# Patient Record
Sex: FEMALE | Race: WHITE | NOT HISPANIC OR LATINO | URBAN - METROPOLITAN AREA
[De-identification: names, ages, dates, MRNs, and addresses within clinical notes are randomized per-mention and may not be internally consistent; named-entity substitution may affect disease eponyms.]

---

## 2021-03-26 DIAGNOSIS — Z23 ENCOUNTER FOR IMMUNIZATION: ICD-10-CM

## 2022-06-07 ENCOUNTER — OFFICE VISIT (OUTPATIENT)
Dept: BARIATRICS | Facility: CLINIC | Age: 52
End: 2022-06-07
Payer: COMMERCIAL

## 2022-06-07 VITALS
WEIGHT: 193 LBS | SYSTOLIC BLOOD PRESSURE: 134 MMHG | HEART RATE: 84 BPM | BODY MASS INDEX: 32.15 KG/M2 | HEIGHT: 65 IN | RESPIRATION RATE: 16 BRPM | DIASTOLIC BLOOD PRESSURE: 84 MMHG

## 2022-06-07 DIAGNOSIS — E66.9 OBESITY, CLASS I, BMI 30-34.9: Primary | ICD-10-CM

## 2022-06-07 DIAGNOSIS — F41.9 ANXIETY: ICD-10-CM

## 2022-06-07 PROBLEM — F32.A DEPRESSION: Status: ACTIVE | Noted: 2022-06-07

## 2022-06-07 PROCEDURE — 99203 OFFICE O/P NEW LOW 30 MIN: CPT | Performed by: NURSE PRACTITIONER

## 2022-06-07 RX ORDER — DIPHENOXYLATE HYDROCHLORIDE AND ATROPINE SULFATE 2.5; .025 MG/1; MG/1
1 TABLET ORAL DAILY
COMMUNITY

## 2022-06-07 RX ORDER — CETIRIZINE HYDROCHLORIDE 10 MG/1
10 TABLET ORAL DAILY
COMMUNITY

## 2022-06-07 RX ORDER — CITALOPRAM 20 MG/1
20 TABLET ORAL DAILY
COMMUNITY
Start: 2021-10-07 | End: 2022-10-07

## 2022-06-07 NOTE — ASSESSMENT & PLAN NOTE
- Discussed options of [facility]-Intensive Lifestyle Intervention Program, Very Low Calorie Diet-VLCD and Conservative Program   - Patient is interested in pursuing [facility]-Intensive Lifestyle Intervention Program  - Initial weight loss goal of 5-10% weight loss for improved health  - Weight loss can improve patient's co-morbid conditions and/or prevent weight-related complications  - Stop Bang 1/8  - Labs reviewed: TSH, lipid panel and CMP 12/27/2021 and all within acceptable range  - Check A1C and fasting insulin   - Not a GLP-1 candidate due to family history of thyroid cancer (father had history of unknown type of thyroid cancer)  Goals:  Do not skip meals  Food log (ie ) www myfitnesspal com,sparkpeople  com,loseit com,calorieking  com,etc  baritastic (use skinnytaste  com, dietdoctor  com or smartphone [place] Med[employer] for recipes)  No sugary beverages  At least 64oz of water daily  Increase physical activity by 10 minutes daily  Gradually increase physical activity to a goal of 5 days per week for 30 minutes of MODERATE intensity PLUS 2 days per week of FULL BODY resistance training (use smartphone apps [employer], Home Workout, etc ) Planning on pool exercises due to knee pain

## 2022-06-07 NOTE — PROGRESS NOTES
Assessment/Plan:    Obesity, Class I, BMI 30-34 9  - Discussed options of [facility]-Intensive Lifestyle Intervention Program, Very Low Calorie Diet-VLCD and Conservative Program   - Patient is interested in pursuing [facility]-Intensive Lifestyle Intervention Program  - Initial weight loss goal of 5-10% weight loss for improved health  - Weight loss can improve patient's co-morbid conditions and/or prevent weight-related complications  - Stop Bang 1/8  - Labs reviewed: TSH, lipid panel and CMP 12/27/2021 and all within acceptable range  - Check A1C and fasting insulin   - Not a GLP-1 candidate due to family history of thyroid cancer (father had history of unknown type of thyroid cancer)  Goals:  Do not skip meals  Food log (ie ) www myfitnesspal com,sparkpeople  com,loseit com,calorieking  com,etc  baritastic (use skinnytaste  com, dietdoctor  com or smartphone [place] [employer] for recipes)  No sugary beverages  At least 64oz of water daily  Increase physical activity by 10 minutes daily  Gradually increase physical activity to a goal of 5 days per week for 30 minutes of MODERATE intensity PLUS 2 days per week of FULL BODY resistance training (use smartphone apps [employer], Home Workout, etc ) Planning on pool exercises due to knee pain  Anxiety  - Taking Celexa  Continue management with prescribing provider  Ronnie Thomas was seen today for consult  Diagnoses and all orders for this visit:    Obesity, Class I, BMI 30-34 9  -     Insulin, fasting; Future  -     HEMOGLOBIN A1C W/ EAG ESTIMATION; Future    Anxiety      Follow up in approximately residential through [facility] with Non-Surgical Physician/Advanced Practitioner  Subjective:   Chief Complaint   Patient presents with    Consult     MWM consult; waist 42 5in; goal wt 150; stop bang 1-8       Patient ID: Aurea Huerta  is a 49 y o  female with excess weight/obesity here to pursue weight management    Previous notes and records have been reviewed  Past Medical History:   Diagnosis Date    Anxiety     Depression      Past Surgical History:   Procedure Laterality Date     SECTION      times 2    WISDOM TOOTH EXTRACTION         HPI:  Wt Readings from Last 20 Encounters:   22 87 5 kg (193 lb)   16 65 8 kg (145 lb 2 1 oz)   02/12/15 64 kg (141 lb)   02/10/14 62 1 kg (137 lb)   12 68 9 kg (152 lb)     Obesity/Excess Weight:  Severity: Mild  Onset:  4 years ago    Modifiers: Diet and Exercise and Commercial Weight Loss Programs-ie  Weight Watchers, Liz Carwin, Nutrisystem, etc   Contributing factors: Stress/Emotional Eating  Associated symptoms: fatigue, increased joint pain and decreased self esteem    Hydration: 3-4 bottles of water daily, 20 oz coffee with tsp 1% milk and sweet and low, 20 oz diet coke daily, 1 cup of crystal light  Alcohol: 4-5 glasses of wine weekly  Smoking: denies  Exercise: walks 2 times per week 1 hour  Occupation:    Sleep: 7-8 hours  STOP ban/8    Highest weight: current  Current weight: 193 lbs  Goal weight: 155-160 lbs    Colonoscopy: scheduled next month  Mammogram: UTD    The following portions of the patient's history were reviewed and updated as appropriate: allergies, current medications, past family history, past medical history, past social history, past surgical history, and problem list     Review of Systems   HENT: Negative for sore throat  Respiratory: Negative for cough and shortness of breath  Cardiovascular: Negative for chest pain and palpitations  Gastrointestinal: Negative for constipation, diarrhea, nausea and vomiting         + GERD diet controlled   Endocrine: Positive for heat intolerance (intermittent at night)  Negative for cold intolerance  Genitourinary: Negative for dysuria  Musculoskeletal: Positive for arthralgias (left knee)  Negative for back pain  Skin: Negative for rash  Neurological: Negative for headaches  Psychiatric/Behavioral: Negative for suicidal ideas (or HI)  + depression and anxiety controlled with medication       Objective:  /84   Pulse 84   Resp 16   Ht 5' 5" (1 651 m)   Wt 87 5 kg (193 lb)   Breastfeeding No   BMI 32 12 kg/m²     Physical Exam  Vitals and nursing note reviewed  Constitutional   General appearance: Abnormal   well developed and obese  Eyes No conjunctival injection  Ears, Nose, Mouth, and Throat Oral mucosa moist    Pulmonary   Respiratory effort: No increased work of breathing or signs of respiratory distress  Cardiovascular     Examination of extremities for edema and/or varicosities: Normal   no edema  Abdomen   Abdomen: Abnormal   The abdomen was obese      Musculoskeletal   Gait and station: Normal     Psychiatric   Orientation to person, place and time: Normal     Affect: appropriate

## 2022-07-12 ENCOUNTER — OFFICE VISIT (OUTPATIENT)
Dept: BARIATRICS | Facility: CLINIC | Age: 52
End: 2022-07-12

## 2022-07-12 VITALS — BODY MASS INDEX: 32.87 KG/M2 | HEIGHT: 65 IN | WEIGHT: 197.31 LBS

## 2022-07-12 DIAGNOSIS — R63.5 ABNORMAL WEIGHT GAIN: Primary | ICD-10-CM

## 2022-07-12 PROCEDURE — RECHECK: Performed by: DIETITIAN, REGISTERED

## 2022-07-12 PROCEDURE — WMPRO12: Performed by: DIETITIAN, REGISTERED

## 2022-07-12 NOTE — PROGRESS NOTES
Weight Management Medical Nutrition Assessment  presented for the New Start session with the [facility] Program   Today's weight is 197 31#        Per dietary recall patient consumes excess calories from stress/bordeom eating  Feel her weight gain started with work and eating out for lunch  We developed and reviewed a low calorie meal plan         Patient seen by Medical Provider in past 6 months:  yes  Requested to schedule appointment with Medical Provider: No      Anthropometric Measurements  Start Weight (#): 197 31  Current Weight (#): 197 31  TBW % Change from start weight: n/a  Ideal Body Weight (#): 125  Goal Weight (#): 150    Weight Loss History  Previous weight loss attempts: Commercial Programs (Weight Watchers, Shavonne Asters, etc )    Food and Nutrition Related History  Wake up: 6:15a  Bed Time: 9:30-10p  -can go through periods of insomnia  -changed jobs 4 months ago to work at home    Food Recall  10oz Coffee right away (1/2 caf, 1/2 decaf), splash of 1% milk, sweet and low  7am Breakfast: eats with her kids, bowl of cereal (1 1/2 cups cherrios, 6oz 1% milk ) and banana    10a Snack: 3 full robert crackers, 12oz diet coke  12-12:30pm Lunch: at home, muffin meatloafs, string cheese or cottage cheese OR deli meat sandwich (2 slices bread, mustard, ham or pepperoni, 1 slice cheese, pickles), finishes her diet doke from the morning  At 4:30p starts to feel bored  Snack: glass of wine, chips and salsa, starts  Making dinner (picks), 2nd glass of wine  6p Dinner: usually more processed, frozen lasagna and garlic bread OR hot dogs and baked beans  Snack: another glass of wine      Beverages: water (with lemon), diet soda, coffee/tea and alcohol  Volume of beverage intake: 10oz coffee, 12 oz diet coke, 2-3 glass of wine, 32oz water    Weekends: Same  Cravings: none  Trouble area of day: after work , 5pm on    [employer] of Eating out: irregularly  Food restrictions: none  Cooking: self   Food Shopping: self    Physical Activity Intake  Activity:Walking  Frequency:2 times a week for 1 mile each time  Physical limitations/barriers to exercise: knee pain    Estimated Needs  Energy  SECA: [id number]      X 1 3 -1000 = 1912 [facility] Energy Needs: BMR : 2730   1-2# loss weekly sedentary:  409-9806            1-2# loss weekly lightly active: 3247-3473  Maintenance calories for sedentary activity level: 1796  Protein:68-86g     (1 2-1 5g/kg IBW)  Fluid: 67oz     (35mL/kg IBW)    Nutrition Diagnosis  Yes; Overweight/obesity  related to Excess energy intake as evidenced by  BMI more than normative standard for age and sex (obesity-grade I 26-30  9)       Nutrition Intervention    Nutrition Prescription  Calories:1400  Protein:75-90g  Fluid:70oz    Meal Plan (Dave/Pro/Carb)  Breakfast:200-300/15-20  Snack:100-150/5-10  Lunch:400/25-30  Snack:100-150/5-10  Dinner:400/25-30  Snack:-    Nutrition Education:    Healthy Core Manual  Calorie controlled menu  Lean protein food choices  Healthy snack options  Food journaling tips      Nutrition Counseling:  Strategies: meal planning, portion sizes, healthy snack choices, hydration, fiber intake, protein intake, exercise, food journal      Monitoring and Evaluation:  Evaluation criteria:  Energy Intake  Meet protein needs  Maintain adequate hydration  Monitor weekly weight  Meal planning/preparation  Food journal   Decreased portions at mealtimes and snacks  Physical activity     Barriers to learning:none  Readiness to change: Preparation:  (Getting ready to change)   Comprehension: very good  Expected Compliance: very good

## 2022-07-21 ENCOUNTER — CLINICAL SUPPORT (OUTPATIENT)
Dept: BARIATRICS | Facility: CLINIC | Age: 52
End: 2022-07-21

## 2022-07-21 VITALS — WEIGHT: 194.2 LBS | BODY MASS INDEX: 32.36 KG/M2 | HEIGHT: 65 IN

## 2022-07-21 DIAGNOSIS — R63.5 ABNORMAL WEIGHT GAIN: Primary | ICD-10-CM

## 2022-07-21 PROCEDURE — RECHECK

## 2022-07-28 ENCOUNTER — CLINICAL SUPPORT (OUTPATIENT)
Dept: BARIATRICS | Facility: CLINIC | Age: 52
End: 2022-07-28

## 2022-07-28 VITALS — HEIGHT: 65 IN | BODY MASS INDEX: 32.06 KG/M2 | WEIGHT: 192.4 LBS

## 2022-07-28 DIAGNOSIS — R63.5 ABNORMAL WEIGHT GAIN: Primary | ICD-10-CM

## 2022-07-28 PROCEDURE — RECHECK

## 2022-08-04 ENCOUNTER — OFFICE VISIT (OUTPATIENT)
Dept: BARIATRICS | Facility: CLINIC | Age: 52
End: 2022-08-04

## 2022-08-04 ENCOUNTER — CLINICAL SUPPORT (OUTPATIENT)
Dept: BARIATRICS | Facility: CLINIC | Age: 52
End: 2022-08-04

## 2022-08-04 VITALS — HEIGHT: 65 IN | BODY MASS INDEX: 31.69 KG/M2 | WEIGHT: 190.2 LBS

## 2022-08-04 DIAGNOSIS — R63.5 ABNORMAL WEIGHT GAIN: Primary | ICD-10-CM

## 2022-08-04 PROCEDURE — RECHECK

## 2022-08-04 PROCEDURE — RECHECK: Performed by: DIETITIAN, REGISTERED

## 2022-08-04 NOTE — PROGRESS NOTES
Weight Management Medical Nutrition Assessment  presented for the month 1 session with the [facility] Program   Today's weight is 190 2#  Overall 7 1# lost x 3 5 weeks 94%)  Patient switched to food journaling via [employer] and is doing well  Calories are often around 1000  She did start with the [facility] fitness center- discussed increasing calories on active days to support physical activity  Protein intakes are at needs at this time  She continues to report caloric beverage intakes (beer or wine) and plans to work towards eliminating this  We developed and reviewed a low calorie meal plan       Patient seen by Medical Provider in past 6 months:  yes  Requested to schedule appointment with Medical Provider: No      Anthropometric Measurements  Start Weight (#): 197 31  Current Weight (#): 190 2  TBW % Change from start weight: 4  Ideal Body Weight (#): 125  Goal Weight (#): 150    Weight Loss History  Previous weight loss attempts: Commercial Programs (Weight Watchers, Mena Obey, etc )    Food and Nutrition Related History  Wake up: 6:15a  Bed Time: 9:30-10p  -can go through periods of insomnia  -changed jobs 4 months ago to work at home    Food Recall-updates in bold  10oz Coffee right away (1/2 caf, 1/2 decaf), splash of 1% milk, sweet and low  7am Breakfast: eats with her kids, bowl of cereal (1 1/2 cups cherrios, 6oz 1% milk ) and banana  ratio keto yogurt (35g protein), fruit   10a Snack: 3 full robert crackers, 12oz diet coke none  12-12:30pm Lunch: at home, muffin meatloafs, string cheese or cottage cheese OR deli meat sandwich (2 slices bread, mustard, ham or pepperoni, 1 slice cheese, pickles), finishes her diet doke from the morning 2 eggs, 1 slice wheat bread  At 4:30p starts to feel bored  Snack: glass of wine, chips and salsa, starts  Making dinner (picks), 2nd glass of wine string cheese and triscuits  6p Dinner: usually more processed, frozen lasagna and garlic bread OR hot dogs and baked beans salad, 5oz protein 1 tbsp dressing, crutons,   Snack: another glass of wine beer      Beverages: water (with lemon), diet soda, coffee/tea and alcohol  Volume of beverage intake: 10oz coffee, 12 oz diet coke, 2-3 glass of wine, 32oz water increased to 48oz     Weekends: Same  Cravings: none  Trouble area of day: after work , 5pm on    [employer] of Eating out: irregularly  Food restrictions: none  Cooking: self   Food Shopping: self    Physical Activity Intake-updates in bold  Activity:Walking joined [facility] going 2x a week  Frequency:2 times a week for 1 mile each time  Physical limitations/barriers to exercise: knee pain    Estimated Needs-updated  Energy  SECA: [id number]      X 1 3 -1000 = 1912 [facility] Energy Needs: BMR : 0228 1-2# loss weekly sedentary:  780-1280            1-2# loss weekly lightly active: 3815-2395  Maintenance calories for sedentary activity level: 1780  Protein:68-86g     (1 2-1 5g/kg IBW)  Fluid: 67oz     (35mL/kg IBW)    Nutrition Diagnosis-continued  Yes; Overweight/obesity  related to Excess energy intake as evidenced by  BMI more than normative standard for age and sex (obesity-grade I 26-30  9)       Nutrition Intervention    Nutrition Prescription-remains appropriate  Calories:1400  Protein:75-90g  Fluid:70oz    Meal Plan (Dave/Pro/Carb)  Breakfast:200-300/15-20  Snack:100-150/5-10  Lunch:400/25-30  Snack:100-150/5-10  Dinner:400/25-30  Snack:-    Nutrition Education:    Healthy Core Manual  Calorie controlled menu  Lean protein food choices  Healthy snack options  Food journaling tips      Nutrition Counseling:  Strategies: meal planning, portion sizes, healthy snack choices, hydration, fiber intake, protein intake, exercise, food journal      Monitoring and Evaluation:  Evaluation criteria:  Energy Intake  Meet protein needs  Maintain adequate hydration  Monitor weekly weight  Meal planning/preparation  Food journal   Decreased portions at mealtimes and snacks  Physical activity     Barriers to learning:none  Readiness to change: Preparation:  (Getting ready to change)  and Action:  (Changing behavior)  Comprehension: very good  Expected Compliance: very good

## 2022-08-11 ENCOUNTER — CLINICAL SUPPORT (OUTPATIENT)
Dept: BARIATRICS | Facility: CLINIC | Age: 52
End: 2022-08-11

## 2022-08-11 VITALS — BODY MASS INDEX: 31.39 KG/M2 | WEIGHT: 188.4 LBS | HEIGHT: 65 IN

## 2022-08-11 DIAGNOSIS — R63.5 ABNORMAL WEIGHT GAIN: Primary | ICD-10-CM

## 2022-08-11 PROCEDURE — RECHECK

## 2022-08-18 ENCOUNTER — CLINICAL SUPPORT (OUTPATIENT)
Dept: BARIATRICS | Facility: CLINIC | Age: 52
End: 2022-08-18

## 2022-08-18 VITALS — WEIGHT: 188.4 LBS | HEIGHT: 65 IN | BODY MASS INDEX: 31.39 KG/M2

## 2022-08-18 DIAGNOSIS — R63.5 ABNORMAL WEIGHT GAIN: Primary | ICD-10-CM

## 2022-08-18 PROCEDURE — RECHECK

## 2022-08-23 NOTE — PROGRESS NOTES
Name           Bri Mata weight 185 6 weight in class this morning per patient  *      Behavioral Health Follow Up Note:  [facility]       Mental health and wellness - Patient presents to the office today for support visit  The pt shared she is really enjoying her classes  The patient explained that she has realized she has a drinking problem and today is the first time she has talked about it besides sharing it with her therapist  She is aware and tracking how much she drinks and aware how many calories she drinks and also how  it has been affecting her  The patient shared life review and ready to make healthy choices  Processed mindless thinking and frustrations she was experiencing  Sw provided supportive listening and educated her on healthy activities she could do to keep busy and active  Re-directed the patient to use her therapist as needed  The patient explained she is enjoying her classes learning healthy eating and working on increasing her water hydration

## 2022-08-25 ENCOUNTER — OFFICE VISIT (OUTPATIENT)
Dept: BARIATRICS | Facility: CLINIC | Age: 52
End: 2022-08-25

## 2022-08-25 ENCOUNTER — CLINICAL SUPPORT (OUTPATIENT)
Dept: BARIATRICS | Facility: CLINIC | Age: 52
End: 2022-08-25

## 2022-08-25 VITALS — HEIGHT: 65 IN | WEIGHT: 185.6 LBS | BODY MASS INDEX: 30.92 KG/M2

## 2022-08-25 DIAGNOSIS — R63.5 ABNORMAL WEIGHT GAIN: Primary | ICD-10-CM

## 2022-08-25 PROCEDURE — RECHECK

## 2022-09-01 ENCOUNTER — CLINICAL SUPPORT (OUTPATIENT)
Dept: BARIATRICS | Facility: CLINIC | Age: 52
End: 2022-09-01

## 2022-09-01 VITALS — HEIGHT: 65 IN | WEIGHT: 183.6 LBS | BODY MASS INDEX: 30.59 KG/M2

## 2022-09-01 DIAGNOSIS — R63.5 ABNORMAL WEIGHT GAIN: Primary | ICD-10-CM

## 2022-09-01 PROCEDURE — RECHECK

## 2022-09-06 ENCOUNTER — OFFICE VISIT (OUTPATIENT)
Dept: BARIATRICS | Facility: CLINIC | Age: 52
End: 2022-09-06
Payer: COMMERCIAL

## 2022-09-06 ENCOUNTER — OFFICE VISIT (OUTPATIENT)
Dept: BARIATRICS | Facility: CLINIC | Age: 52
End: 2022-09-06

## 2022-09-06 VITALS
BODY MASS INDEX: 30.86 KG/M2 | HEART RATE: 70 BPM | SYSTOLIC BLOOD PRESSURE: 134 MMHG | WEIGHT: 185.2 LBS | HEIGHT: 65 IN | DIASTOLIC BLOOD PRESSURE: 84 MMHG | RESPIRATION RATE: 16 BRPM

## 2022-09-06 DIAGNOSIS — R63.5 ABNORMAL WEIGHT GAIN: Primary | ICD-10-CM

## 2022-09-06 DIAGNOSIS — F32.A DEPRESSION: ICD-10-CM

## 2022-09-06 DIAGNOSIS — F41.9 ANXIETY: ICD-10-CM

## 2022-09-06 DIAGNOSIS — E66.9 OBESITY, CLASS I, BMI 30-34.9: Primary | ICD-10-CM

## 2022-09-06 DIAGNOSIS — F10.20: ICD-10-CM

## 2022-09-06 PROCEDURE — RECHECK: Performed by: DIETITIAN, REGISTERED

## 2022-09-06 PROCEDURE — 99213 OFFICE O/P EST LOW 20 MIN: CPT | Performed by: NURSE PRACTITIONER

## 2022-09-06 NOTE — PROGRESS NOTES
Assessment/Plan:     Obesity, Class I, BMI 30-34 9  - Patient is pursuing Healthy[facility]-Intensive Lifestyle Intervention Program  - Initial weight loss goal of 5-10% weight loss for improved health  - Not a GLP-1 candidate due to family history of thyroid cancer (father had history of unknown type of thyroid cancer)  - Get A1C and fasting insulin drawn   - Feels that she is struggling with alcohol addiction and needs assistance with that  Referral to alcohol rehab placed  Initial: 193 lbs  Current: 185 2 lbs  Change: -7 8 lbs  Goal: 155-160 lbs    Goals:  Do not skip meals  Continue food logging  Keep up the great work with water intake  Keep up the great work with walking  Continue nutrition recommendations per dietician   Nutrition Prescription  Calories:1400  Protein:75-90g  Fluid:70oz        Depression  - Taking celexa  Continue management with prescribing provider  Dwyane Jacobs was seen today for follow-up  Diagnoses and all orders for this visit:    Obesity, Class I, BMI 30-34 9    Alcohol addiction ([facility] [address] )  -     Cancel: Ambulatory Referral to Alcohol Rehabilitation; Future  -     Ambulatory Referral to Alcohol Rehabilitation; Future    Depression    Anxiety        Follow up in approximately 2 months with Non-Surgical Physician/Advanced Practitioner  Subjective:   Chief Complaint   Patient presents with    Follow-up     MWM 2mth f/u; waist 40in       Patient ID: Megan Philippe  is a 49 y o  female with excess weight/obesity here to pursue weight management  Patient is pursuing [facility]-Intensive Lifestyle Intervention Program    Most recent notes and records were reviewed      HPI    Wt Readings from Last 10 Encounters:   09/06/22 84 kg (185 lb 3 2 oz)   09/01/22 83 3 kg (183 lb 9 6 oz)   08/25/22 84 2 kg (185 lb 9 6 oz)   08/18/22 85 5 kg (188 lb 6 4 oz)   08/11/22 85 5 kg (188 lb 6 4 oz)   08/04/22 86 3 kg (190 lb 3 2 oz)   08/04/22 86 3 kg (190 lb 3 2 oz)   07/28/22 87 3 kg (192 lb 6 4 oz)   07/21/22 88 1 kg (194 lb 3 2 oz)   07/12/22 89 5 kg (197 lb 5 oz)     Enjoying [facility], feels that it has been helpful  Has been food logging: getting around 7595-1664 calories per day  Hydration: 90 oz water, 20 oz coffee with tsp 1% milk and sweet and low, 20 oz diet coke daily, 1 cup of crystal light daily  Alcohol: 3-6 glasses wine per week  Feels that she needs help with this  Exercise: walks daily 8,000-11,000 steps  Occupation:        B- ratio yogurt and banana   S- none  L- veggies and hard boiled egg or cottage cheese and portion triscuits   S- apples and PB  D- protein and veggies or salad and portioned starch  S- none     Colonoscopy: UTD, due 5-10 years  Mammogram: UTD, due Aug 2023        The following portions of the patient's history were reviewed and updated as appropriate: allergies, current medications, past family history, past medical history, past social history, past surgical history, and problem list     Review of Systems   HENT: Negative for sore throat  Respiratory: Negative for cough and shortness of breath  Cardiovascular: Negative for chest pain and palpitations  Gastrointestinal: Negative for abdominal pain, constipation, diarrhea, nausea and vomiting  Denies GERD   Skin: Negative for rash  Psychiatric/Behavioral: Negative for suicidal ideas (or HI)  Denies depression  + anxiety controlled       Objective:  /84   Pulse 70   Resp 16   Ht 5' 5" (1 651 m)   Wt 84 kg (185 lb 3 2 oz)   Breastfeeding No   BMI 30 82 kg/m²     Physical Exam  Vitals and nursing note reviewed  Constitutional   General appearance: Abnormal   well developed and obese  Eyes No conjunctival injection  Ears, Nose, Mouth, and Throat Oral mucosa moist    Pulmonary   Respiratory effort: No increased work of breathing or signs of respiratory distress       Cardiovascular     Examination of extremities for edema and/or varicosities: Normal   no edema  Abdomen   Abdomen: Abnormal   The abdomen was obese      Musculoskeletal   Gait and station: Normal     Psychiatric   Orientation to person, place and time: Normal     Affect: appropriate

## 2022-09-06 NOTE — PROGRESS NOTES
Weight Management Medical Nutrition Assessment  presented for the month 2 session with the [address] [place] [address]   Today's weight is 185 2#    Overall 12 1# lost x 2 months (6%)  Continues to food journal-declined RD review today  Reports calories are at 0452-7104 and proteins 75-80g  Saw MWM provider today and reports that she is interested in support to help decrease her alcohol intakes  She has increased her walking 3 nights a week instead of going to the gym  We developed and reviewed a low calorie meal plan       Patient seen by Medical Provider in past 6 months:  yes  Requested to schedule appointment with Medical Provider: No      Anthropometric Measurements  Start Weight (#): 197 31  Current Weight (#): 182 5  TBW % Change from start weight: 6  Ideal Body Weight (#): 125  Goal Weight (#): 150    Weight Loss History  Previous weight loss attempts: Commercial Programs (Weight Watchers, Mena Obey, etc )    Food and Nutrition Related History  Wake up: 6:15a  Bed Time: 9:30-10p  -can go through periods of insomnia   -changed jobs 4 months ago to work at home    Food Recall-updates in bold  10oz Coffee right away (1/2 caf, 1/2 decaf), splash of 1% milk, sweet and low  7am Breakfast: eats with her kids, bowl of cereal (1 1/2 cups cherrios, 6oz 1% milk ) and banana  ratio keto yogurt (35g protein), fruit   10a Snack: 3 full robert crackers, 12oz diet coke none  12-12:30pm Lunch: at home, muffin meatloafs, string cheese or cottage cheese OR deli meat sandwich (2 slices bread, mustard, ham or pepperoni, 1 slice cheese, pickles), finishes her diet doke from the morning 2 eggs, 1 slice wheat bread  At 4:30p starts to feel bored  Snack: glass of wine, chips and salsa, starts  Making dinner (picks), 2nd glass of wine string cheese and triscuits  6p Dinner: usually more processed, frozen lasagna and garlic bread OR hot dogs and baked beans salad, 5oz protein 1 tbsp dressing, crutons,   Snack: another glass of wine beer      Beverages: water (with lemon), diet soda, coffee/tea and alcohol  Volume of beverage intake: 10oz coffee, 12 oz diet coke, 2-3 glass of wine, 32oz water increased to 48oz     Weekends: Same  Cravings: none  Trouble area of day: after work , 5pm on    [employer] of Eating out: irregularly  Food restrictions: none  Cooking: self   Food Shopping: self    Physical Activity Intake-updates in bold  Activity:Walking joined Mariam Weblance going 2x a week  -walking daily, but 75 minutes 3 times a week during her daughter's soccer practice (10,000-11,000)  Frequency:2 times a week for 1 mile each time  Physical limitations/barriers to exercise: knee pain    Estimated Needs-updated  Energy  SECA: [facility]:[id number]      X 1 3 -1000 = 1912 [facility] Energy Needs: BMR : 1117 1-2# loss weekly sedentary:  780-1280            1-2# loss weekly lightly active: 6352-6985  Maintenance calories for sedentary activity level: 1780  Protein:68-86g     (1 2-1 5g/kg IBW)  Fluid: 67oz     (35mL/kg IBW)    Nutrition Diagnosis-continued  Yes; Overweight/obesity  related to Excess energy intake as evidenced by  BMI more than normative standard for age and sex (obesity-grade I 26-30  9)       Nutrition Intervention    Nutrition Prescription-remains appropriate  Calories:1400  Protein:75-90g  Fluid:70oz    Meal Plan (Dave/Pro/Carb)  Breakfast:200-300/15-20  Snack:100-150/5-10  Lunch:400/25-30  Snack:100-150/5-10  Dinner:400/25-30  Snack:-    Nutrition Education:    Healthy Core Manual  Calorie controlled menu  Lean protein food choices  Healthy snack options  Food journaling tips      Nutrition Counseling:  Strategies: meal planning, portion sizes, healthy snack choices, hydration, fiber intake, protein intake, exercise, food journal      Monitoring and Evaluation:  Evaluation criteria:  Energy Intake  Meet protein needs  Maintain adequate hydration  Monitor weekly weight  Meal planning/preparation  Food journal   Decreased portions at mealtimes and snacks  Physical activity     Barriers to learning:none  Readiness to change: Action:  (Changing behavior)  Comprehension: very good  Expected Compliance: very good

## 2022-09-06 NOTE — ASSESSMENT & PLAN NOTE
- Patient is pursuing Healthy[facility]-Intensive Lifestyle Intervention Program  - Initial weight loss goal of 5-10% weight loss for improved health  - Not a GLP-1 candidate due to family history of thyroid cancer (father had history of unknown type of thyroid cancer)  - Get A1C and fasting insulin drawn   - Feels that she is struggling with alcohol addiction and needs assistance with that  Referral to alcohol rehab placed  Initial: 193 lbs  Current: 185 2 lbs  Change: -7 8 lbs  Goal: 155-160 lbs    Goals:  Do not skip meals  Continue food logging     Keep up the great work with water intake  Keep up the great work with walking  Continue nutrition recommendations per dietician   Nutrition Prescription  Calories:1400  Protein:75-90g  Fluid:70oz

## 2022-09-08 ENCOUNTER — CLINICAL SUPPORT (OUTPATIENT)
Dept: BARIATRICS | Facility: CLINIC | Age: 52
End: 2022-09-08

## 2022-09-08 VITALS — WEIGHT: 182.8 LBS | BODY MASS INDEX: 30.46 KG/M2 | HEIGHT: 65 IN

## 2022-09-08 DIAGNOSIS — R63.5 ABNORMAL WEIGHT GAIN: Primary | ICD-10-CM

## 2022-09-08 PROCEDURE — RECHECK

## 2022-09-22 ENCOUNTER — CLINICAL SUPPORT (OUTPATIENT)
Dept: BARIATRICS | Facility: CLINIC | Age: 52
End: 2022-09-22

## 2022-09-22 VITALS — BODY MASS INDEX: 30.02 KG/M2 | WEIGHT: 180.2 LBS | HEIGHT: 65 IN

## 2022-09-22 DIAGNOSIS — R63.5 ABNORMAL WEIGHT GAIN: Primary | ICD-10-CM

## 2022-09-22 PROCEDURE — RECHECK

## 2022-09-29 ENCOUNTER — CLINICAL SUPPORT (OUTPATIENT)
Dept: BARIATRICS | Facility: CLINIC | Age: 52
End: 2022-09-29

## 2022-09-29 VITALS — BODY MASS INDEX: 29.99 KG/M2 | WEIGHT: 180 LBS | HEIGHT: 65 IN

## 2022-09-29 DIAGNOSIS — R63.5 ABNORMAL WEIGHT GAIN: Primary | ICD-10-CM

## 2022-09-29 PROCEDURE — RECHECK

## 2022-10-06 ENCOUNTER — CLINICAL SUPPORT (OUTPATIENT)
Dept: BARIATRICS | Facility: CLINIC | Age: 52
End: 2022-10-06

## 2022-10-06 VITALS — WEIGHT: 178.8 LBS | BODY MASS INDEX: 29.79 KG/M2 | HEIGHT: 65 IN

## 2022-10-06 DIAGNOSIS — R63.5 ABNORMAL WEIGHT GAIN: Primary | ICD-10-CM

## 2022-10-06 PROCEDURE — RECHECK

## 2022-10-11 ENCOUNTER — OFFICE VISIT (OUTPATIENT)
Dept: BARIATRICS | Facility: CLINIC | Age: 52
End: 2022-10-11

## 2022-10-11 ENCOUNTER — APPOINTMENT (OUTPATIENT)
Dept: LAB | Facility: MEDICAL CENTER | Age: 52
End: 2022-10-11
Payer: COMMERCIAL

## 2022-10-11 VITALS — BODY MASS INDEX: 29.57 KG/M2 | HEIGHT: 65 IN | WEIGHT: 177.47 LBS

## 2022-10-11 DIAGNOSIS — R63.5 ABNORMAL WEIGHT GAIN: Primary | ICD-10-CM

## 2022-10-11 DIAGNOSIS — E66.9 OBESITY, CLASS I, BMI 30-34.9: ICD-10-CM

## 2022-10-11 LAB
EST. AVERAGE GLUCOSE BLD GHB EST-MCNC: 131 MG/DL
HBA1C MFR BLD: 6.2 %
INSULIN SERPL-ACNC: 11.3 MU/L (ref 3–25)

## 2022-10-11 PROCEDURE — 83525 ASSAY OF INSULIN: CPT

## 2022-10-11 PROCEDURE — RECHECK: Performed by: DIETITIAN, REGISTERED

## 2022-10-11 PROCEDURE — 36415 COLL VENOUS BLD VENIPUNCTURE: CPT

## 2022-10-11 PROCEDURE — 83036 HEMOGLOBIN GLYCOSYLATED A1C: CPT

## 2022-10-11 NOTE — PROGRESS NOTES
Weight Management Medical Nutrition Assessment  presented for the month 3 session with the [facility] Program   Today's weight is 177 47#  Overall 19 8# lost x 3 months (10%)  SECA scale completed and reviewed with patient- 71% weight loss from fat mass, 29% weight loss from FFm, 1 7% increase in TBW-desired  REE completed, 1627, +9% of predicted- WNL  Patient reports that she has not had a drink in 28 days  She is following with her PCP as this time for this  She reports feeling great  She is walking daily- discussed incorporating resistance training to compliment this (wants to look for something at home vs going to [facility])  Continue to remain motivated for weight loss  Her plan is to meet with her PCP and [facility] provider than schedule bundles with RD  We developed and reviewed a low calorie meal plan       Patient seen by Medical Provider in past 6 months:  yes  Requested to schedule appointment with Medical Provider: No      Anthropometric Measurements  Start Weight (#): 197 31  Current Weight (#): 177 47  TBW % Change from start weight: 10  Ideal Body Weight (#): 125  Goal Weight (#): 150    Weight Loss History  Previous weight loss attempts: Commercial Programs (Weight Watchers, [employer], etc )    Food and Nutrition Related History  Wake up: 6:15a  Bed Time: 9:30-10p  -can go through periods of insomnia   -changed jobs 4 months ago to work at home    Food Recall  10oz Coffee right away (1/2 caf, 1/2 decaf), splash of 1% milk, sweet and low  7am Breakfast:  ratio keto yogurt (35g protein), fruit  Sometimes 647 bread with an egg  10a Snack: none  12-12:30pm Lunch: at home, muffin meatloafs, string cheese or cottage cheese OR deli meat sandwich (2 slices bread, mustard, ham or pepperoni, 1 slice cheese, pickles), finishes her diet doke from the morning 2 eggs, 1 slice wheat bread  At 4:30p starts to feel bored  Snack: with her daughter when she gets off the bus- banana with PB or plain ff greek yogurt with 1 tsp SF cheesecake jello mix with berries   6p Dinner: salad, 5oz protein 1 tbsp dressing, crutons,   Snack: skinny decaf babs latte with skim milk and sugar    Beverages: water (with lemon), diet soda, coffee/tea and alcohol  Volume of beverage intake: 10oz coffee,  48oz water increased to 60oz (sometimes adds crystal light)    Weekends: Same   Cravings: none  Trouble area of day: after work , 5pm on    [employer] of Eating out: irregularly  Food restrictions: none  Cooking: self   Food Shopping: self    Physical Activity Intake-updates in bold  Activity:Walking walking nightly 25-60 minutes, more on soccer nights), discussed incorporating resistance activities  Frequency:daily  Physical limitations/barriers to exercise: knee pain    Estimated Needs-updated  Energy  REE: 9906; weight loss zone: 9785-5650  SECA: BMR:1500     X 1 3 -1000 = 1515 [employer] Energy Needs: BMR : 1426 1-2# loss weekly sedentary:  711-1211            1-2# loss weekly lightly active: 961-1461  Maintenance calories for sedentary activity level:1711  Protein:68-86g     (1 2-1 5g/kg IBW)  Fluid: 67oz     (35mL/kg IBW)    Nutrition Diagnosis-continued  Yes; Overweight/obesity  related to Excess energy intake as evidenced by  BMI more than normative standard for age and sex (obesity-grade I 26-30  9)       Nutrition Intervention    Nutrition Prescription-remains appropriate  Calories:1400  Protein:75-90g  Fluid:70oz    Meal Plan (Dave/Pro/Carb)  Breakfast:200-300/15-20  Snack:100-150/5-10  Lunch:400/25-30  Snack:100-150/5-10  Dinner:400/25-30  Snack:-    Nutrition Education:    Healthy [facility] Manual  Calorie controlled menu  Lean protein food choices  Healthy snack options  Food journaling tips      Nutrition Counseling:  Strategies: meal planning, portion sizes, healthy snack choices, hydration, fiber intake, protein intake, exercise, food journal      Monitoring and Evaluation:  Evaluation criteria:  Energy Intake  Meet protein needs  Maintain adequate hydration  Monitor weekly weight  Meal planning/preparation  Food journal   Decreased portions at mealtimes and snacks  Physical activity     Barriers to learning:none  Readiness to change: Action:  (Changing behavior)  Comprehension: very good  Expected Compliance: very good

## 2022-10-13 ENCOUNTER — CLINICAL SUPPORT (OUTPATIENT)
Dept: BARIATRICS | Facility: CLINIC | Age: 52
End: 2022-10-13

## 2022-10-13 ENCOUNTER — TELEPHONE (OUTPATIENT)
Dept: BARIATRICS | Facility: CLINIC | Age: 52
End: 2022-10-13

## 2022-10-13 VITALS — HEIGHT: 65 IN | BODY MASS INDEX: 29.62 KG/M2 | WEIGHT: 177.8 LBS

## 2022-10-13 DIAGNOSIS — R63.5 ABNORMAL WEIGHT GAIN: Primary | ICD-10-CM

## 2022-10-13 PROCEDURE — RECHECK

## 2022-10-13 NOTE — TELEPHONE ENCOUNTER
----- Message from Ban Morel sent at 10/13/2022  8:24 AM EDT -----  Please let the patient know I have reviewed her fasting insulin and A1C  Fasting insulin was within normal range  HgbA1c is elevated consistent with prediabetes  She should avoid refined carbohydrates including white bread, rice, pasta, pretzels, chips, desserts and sugary beverages  This can improve weight weight loss and cardiovascular exercise

## 2022-11-01 ENCOUNTER — OFFICE VISIT (OUTPATIENT)
Dept: BARIATRICS | Facility: CLINIC | Age: 52
End: 2022-11-01

## 2022-11-01 VITALS
RESPIRATION RATE: 16 BRPM | SYSTOLIC BLOOD PRESSURE: 142 MMHG | WEIGHT: 175 LBS | BODY MASS INDEX: 29.16 KG/M2 | HEART RATE: 76 BPM | DIASTOLIC BLOOD PRESSURE: 86 MMHG | HEIGHT: 65 IN

## 2022-11-01 DIAGNOSIS — F32.A DEPRESSION: ICD-10-CM

## 2022-11-01 DIAGNOSIS — E66.3 OVERWEIGHT: Primary | ICD-10-CM

## 2022-11-01 RX ORDER — HYDROQUINONE 40 MG/G
CREAM TOPICAL
COMMUNITY
Start: 2022-10-24

## 2022-11-01 RX ORDER — VALACYCLOVIR HYDROCHLORIDE 1 G/1
TABLET, FILM COATED ORAL
COMMUNITY
Start: 2022-10-24

## 2022-11-01 RX ORDER — DISULFIRAM 250 MG/1
TABLET ORAL
COMMUNITY
Start: 2022-10-26

## 2022-11-01 RX ORDER — LANOLIN ALCOHOL/MO/W.PET/CERES
100 CREAM (GRAM) TOPICAL DAILY
COMMUNITY
Start: 2022-09-12 | End: 2023-09-12

## 2022-11-01 NOTE — PROGRESS NOTES
Assessment/Plan:     Overweight  - Patient is pursuing [facility] Lif[facility] Intervention Program  - Initial weight loss goal of 5-10% weight loss for improved health - met  - Following last visit was started on antabuse by per primary care provider for excessive alcohol intake  Feeling much better since abstaining from alcohol    - Has been successfully losing weight and given she was recently started on antabuse, will not start weight loss medications  - Father had history of thyroid cancer  She will try to find out which type, as that information would need to be known in the future if considering a GLP-1   - A1C and fasting insulin 10/11/2022  A1C 6 2 in prediabetes range, which will likely improve with weight loss, fasting insulin was within normal limits  Initial: 193 lbs BMI 32 1  Current: 175 lbs BMI 29 1  Change: -18 lbs  Goal: 155-160 lbs    Goals:  Continue food logging  Keep up the great work with water intake  Gradually increase walking to goal of 5 days per week for 30 minutes  Continue gradually reducing diet soda  Continue nutrition recommendations per dietician  Continue to abstain from alcohol  Start [facility]  Depression  - Taking celexa  Continue management with prescribing provider  Cristal was seen today for follow-up  Diagnoses and all orders for this visit:    Overweight    Depression        Follow up in approximately end of January 2023 with Non-Surgical Physician/Advanced Practitioner  Subjective:   Chief Complaint   Patient presents with   • Follow-up     MWM 4mth f/u; waist       Patient ID: Ap Arias  is a 47 y o  female with excess weight/obesity here to pursue weight management  Patient is pursuing [facility] Program    Most recent notes and records were reviewed      HPI    Wt Readings from Last 10 Encounters:   11/01/22 79 4 kg (175 lb)   10/13/22 80 6 kg (177 lb 12 8 oz)   10/11/22 80 5 kg (177 lb 7 5 oz)   10/06/22 81 1 kg (178 lb 12 8 oz)   09/29/22 81 6 kg (180 lb)   09/22/22 81 7 kg (180 lb 3 2 oz)   09/08/22 82 9 kg (182 lb 12 8 oz)   09/06/22 84 kg (185 lb 3 2 oz)   09/01/22 83 3 kg (183 lb 9 6 oz)   08/25/22 84 2 kg (185 lb 9 6 oz)     Just completed [facility] and is interested in [facility] to stay accountable  Was started on antabuse by her primary care provider due to excessive alcohol intake  Has not had an alcoholic drink in 52 days and is feeling very well  Sleeping much better         Has been food logging: getting under 1400 calories per day       Hydration: 90 oz water, 15 oz coffee with FF milk and one sweet and low, 12 oz diet coke daily - working on eliminating it, 1 cup of crystal light daily  Alcohol: no alcohol past 52 days  Exercise: walks 2 days week for 1 hour 15 minutes    Occupation:    Sleep: 8 hours     Colonoscopy: UTD, due 5-10 years  Mammogram: UTD, due Aug 2023       The following portions of the patient's history were reviewed and updated as appropriate: allergies, current medications, past family history, past medical history, past social history, past surgical history, and problem list     Family History   Problem Relation Age of Onset   • Diabetes Mother    • Hypertension Mother    • Heart disease Father    • Heart attack Father    • Thyroid cancer Father    • No Known Problems Sister    • No Known Problems Brother    • Stroke Neg Hx    • Hypothyroidism Neg Hx         Review of Systems   HENT: Negative for sore throat  Respiratory: Negative for cough and shortness of breath  Cardiovascular: Negative for chest pain and palpitations  Gastrointestinal: Negative for abdominal pain, constipation, diarrhea, nausea and vomiting  Denies GERD   Skin: Negative for rash  Psychiatric/Behavioral: Negative for suicidal ideas (or HI)          Denies depression and anxiety       Objective:  /86   Pulse 76   Resp 16   Ht 5' 5" (1 651 m)   Wt 79 4 kg (175 lb)   Breastfeeding No   BMI 29 12 kg/m²     Physical Exam  Vitals and nursing note reviewed  Constitutional   General appearance: Abnormal   well developed and overweight  Eyes No conjunctival injection  Ears, Nose, Mouth, and Throat Oral mucosa moist    Pulmonary   Respiratory effort: No increased work of breathing or signs of respiratory distress  Cardiovascular   Examination of extremities for edema and/or varicosities: Normal   no edema  Abdomen   Abdomen: Abnormal overweight       Musculoskeletal   Normal range of motion  Neurological   Gait and station: Normal    Psychiatric   Orientation to person, place and time: Normal     Affect: appropriate

## 2022-11-01 NOTE — ASSESSMENT & PLAN NOTE
- Patient is pursuing [facility]vention Program  - Initial weight loss goal of 5-10% weight loss for improved health - met  - Following last visit was started on antabuse by per primary care provider for excessive alcohol intake  Feeling much better since abstaining from alcohol    - Has been successfully losing weight and given she was recently started on antabuse, will not start weight loss medications  - Father had history of thyroid cancer  She will try to find out which type, as that information would need to be known in the future if considering a GLP-1   - A1C and fasting insulin 10/11/2022  A1C 6 2 in prediabetes range, which will likely improve with weight loss, fasting insulin was within normal limits  Initial: 193 lbs BMI 32 1  Current: 175 lbs BMI 29 1  Change: -18 lbs  Goal: 155-160 lbs    Goals:  Continue food logging  Keep up the great work with water intake  Gradually increase walking to goal of 5 days per week for 30 minutes  Continue gradually reducing diet soda  Continue nutrition recommendations per dietician  Continue to abstain from alcohol  Start [facility]

## 2022-11-10 ENCOUNTER — CLINICAL SUPPORT (OUTPATIENT)
Dept: BARIATRICS | Facility: CLINIC | Age: 52
End: 2022-11-10

## 2022-11-10 VITALS — BODY MASS INDEX: 28.79 KG/M2 | HEIGHT: 65 IN | WEIGHT: 172.8 LBS

## 2022-11-10 DIAGNOSIS — R63.5 ABNORMAL WEIGHT GAIN: Primary | ICD-10-CM

## 2022-11-17 ENCOUNTER — CLINICAL SUPPORT (OUTPATIENT)
Dept: BARIATRICS | Facility: CLINIC | Age: 52
End: 2022-11-17

## 2022-11-17 VITALS — BODY MASS INDEX: 28.46 KG/M2 | WEIGHT: 170.8 LBS | HEIGHT: 65 IN

## 2022-11-17 DIAGNOSIS — R63.5 ABNORMAL WEIGHT GAIN: Primary | ICD-10-CM

## 2022-12-01 ENCOUNTER — OFFICE VISIT (OUTPATIENT)
Dept: BARIATRICS | Facility: CLINIC | Age: 52
End: 2022-12-01

## 2022-12-01 VITALS — HEIGHT: 65 IN | WEIGHT: 169.4 LBS | BODY MASS INDEX: 28.22 KG/M2

## 2022-12-01 DIAGNOSIS — R63.5 ABNORMAL WEIGHT GAIN: Primary | ICD-10-CM

## 2022-12-01 NOTE — PROGRESS NOTES
Weight Management Medical Nutrition Assessment  presented for the month 4 session now with the [facility]   Today's weight is 169 4#  Overall 28# lost x 4 months (14%)  Patient continues to be alcohol free and reports that she is in a very good place eating/meal plan wise  She is desiring to incorporate more consistent activity  Plans to try to do it first thing in her day  For the month of December she is not going to renew for the [facility] program as she wants to trial bring more independent since she is doing well  She has follow-up scheduled for January with MWM provider and PCP for f/u on her HgbA1C  We developed and reviewed a low calorie meal plan       Patient seen by Medical Provider in past 6 months:  yes  Requested to schedule appointment with Medical Provider: No      Anthropometric Measurements  Start Weight (#): 197 31  Current Weight (#): 169 4  TBW % Change from start weight: 14  Ideal Body Weight (#): 125  Goal Weight (#): 150    Weight Loss History  Previous weight loss attempts: Commercial Programs ([employer], [facility]es, etc )    Food and Nutrition Related History  Wake up: 6:15a  Bed Time: 9:30-10p  -can go through periods of insomnia   -changed jobs 4 months ago to work at home    Food Recall  10oz Coffee right away (1/2 caf, 1/2 decaf), splash of 1% milk (fair life milk), sweet and low   7am Breakfast:  ratio keto yogurt (35g protein), fruit  Sometimes 647 bread with an egg  10a Snack: none  12-12:30pm Lunch: at home, muffin meatloafs, string cheese or cottage cheese OR deli meat sandwich (2 slices bread, mustard, ham or pepperoni, 1 slice cheese, pickles), finishes her diet doke from the morning 2 eggs, 1 slice wheat bread now making sure she takes a break from work for lunch, salad kit with hard boiled eggs or ground turkey with dressing and triscuits  At 4:30p starts to feel bored  Snack: with her daughter when she gets off the bus- banana with PB or plain ff greek yogurt with 1 tsp SF cheesecake jello mix with berries   6p Dinner: salad, 5oz protein 1 tbsp dressing, crutons,  Or brown rice  Snack: skinny decaf babs latte with skim milk (using fair life milk)and sugar    Beverages: water (with lemon), diet soda, coffee/tea and alcohol  Volume of beverage intake: 10oz coffee,  60oz (sometimes adds crystal light)  Now closer to 64oz     Weekends: Same   Cravings: none  Trouble area of day: after work , 5pm on    [employer] of Eating out: irregularly  Food restrictions: none  Cooking: self   Food Shopping: self    Physical Activity Intake-updates in bold  Activity:walking nightly 25-60 minutes, more on soccer nights), discussed incorporating resistance activities patient feels she needs to put more focus on this  Frequency:daily  Physical limitations/barriers to exercise: knee pain    Estimated Needs-updated  Energy  REE: 1627; weight loss zone: 4886-8311  SECA: BMR:1500     X 1 3 -1000 = 1515 [facility] Energy Needs: BMR : 1426 1-2# loss weekly sedentary:  711-1211            1-2# loss weekly lightly active: 961-1461  Maintenance calories for sedentary activity level:1711  Protein:68-86g     (1 2-1 5g/kg IBW)  Fluid: 67oz     (35mL/kg IBW)    Nutrition Diagnosis-continued  Yes; Overweight/obesity  related to Excess energy intake as evidenced by  BMI more than normative standard for age and sex (obesity-grade I 26-30  9)       Nutrition Intervention    Nutrition Prescription-remains appropriate  Calories:1400  Protein:75-90g  Fluid:70oz    Meal Plan (Dave/Pro/Carb)  Breakfast:200-300/15-20  Snack:100-150/5-10  Lunch:400/25-30  Snack:100-150/5-10  Dinner:400/25-30  Snack:-    Nutrition Education:    Healthy [facility] Manual  Calorie controlled menu  Lean protein food choices  Healthy snack options  Food journaling tips      Nutrition Counseling:  Strategies: meal planning, portion sizes, healthy snack choices, hydration, fiber intake, protein intake, exercise, food journal      Monitoring and Evaluation:  Evaluation criteria:  Energy Intake  Meet protein needs  Maintain adequate hydration  Monitor weekly weight  Meal planning/preparation  Food journal   Decreased portions at mealtimes and snacks  Physical activity     Barriers to learning:none  Readiness to change: Action:  (Changing behavior)  Comprehension: very good  Expected Compliance: very good

## 2023-01-31 ENCOUNTER — OFFICE VISIT (OUTPATIENT)
Dept: BARIATRICS | Facility: CLINIC | Age: 53
End: 2023-01-31

## 2023-01-31 VITALS
SYSTOLIC BLOOD PRESSURE: 128 MMHG | WEIGHT: 168.2 LBS | BODY MASS INDEX: 28.02 KG/M2 | HEIGHT: 65 IN | DIASTOLIC BLOOD PRESSURE: 72 MMHG | HEART RATE: 76 BPM | RESPIRATION RATE: 16 BRPM

## 2023-01-31 DIAGNOSIS — F32.A DEPRESSION: ICD-10-CM

## 2023-01-31 DIAGNOSIS — E66.3 OVERWEIGHT: Primary | ICD-10-CM

## 2023-01-31 NOTE — ASSESSMENT & PLAN NOTE
- Patient is pursuing [facility] Program  Previously completed [facility] and was also in [facility]  - Initial weight loss goal of 5-10% weight loss for improved health - met  - Has been successfully losing weight, appetite and cravings under good control, hence no need to start weight loss medications currently  Can consider in the future if needed  - Not a GLP-1 candidate, as father had history of thyroid cancer  She is not able to find out which type of thyroid cancer    - Blood work reviewed: A1C, CMP, and lipid panel 1/23/2023  Glucose, chol and LDL elevated, which will likely improve with weight loss  Otherwise, labs within acceptable range  A1C improved to 5 6 from 6 2 in October 2022  Initial: 193 lbs BMI 32 1  Current: 168 1 lbs BMI 27 99  Change: -24 9 lbs  Goal: 155-160 lbs    Goals:  Get back to food logging  Keep up the great work with water intake  Keep up the great work with exercise  Continue reducing diet soda  Continue nutrition recommendations per dietician  1400 calories per day  Continue to abstain from alcohol

## 2023-01-31 NOTE — PROGRESS NOTES
Assessment/Plan:     Overweight  - Patient is pursuing [facility] Program  Previously completed [facility] and was also in [facility]  - Initial weight loss goal of 5-10% weight loss for improved health - met  - Has been successfully losing weight, appetite and cravings under good control, hence no need to start weight loss medications currently  Can consider in the future if needed  - Not a GLP-1 candidate, as father had history of thyroid cancer  She is not able to find out which type of thyroid cancer    - Blood work reviewed: A1C, CMP, and lipid panel 1/23/2023  Glucose, chol and LDL elevated, which will likely improve with weight loss  Otherwise, labs within acceptable range  A1C improved to 5 6 from 6 2 in October 2022  Initial: 193 lbs BMI 32 1  Current: 168 1 lbs BMI 27 99  Change: -24 9 lbs  Goal: 155-160 lbs    Goals:  Get back to food logging  Keep up the great work with water intake  Keep up the great work with exercise  Continue reducing diet soda  Continue nutrition recommendations per dietician  1400 calories per day  Continue to abstain from alcohol  Depression  - Taking celexa  Continue management with prescribing provider  Radha Spears was seen today for follow-up  Diagnoses and all orders for this visit:    Overweight    Depression        Follow up in approximately 3 months with Non-Surgical Physician/Advanced Practitioner  Subjective:   Chief Complaint   Patient presents with   • Follow-up     MWM 2mth f/u; waist 37 5in       Patient ID: Peng Lin  is a 47 y o  female with excess weight/obesity here to pursue weight management  Patient is pursuing Conservative Program    Most recent notes and records were reviewed      HPI    Wt Readings from Last 10 Encounters:   01/31/23 76 3 kg (168 lb 3 2 oz)   12/01/22 76 8 kg (169 lb 6 4 oz)   11/17/22 77 5 kg (170 lb 12 8 oz)   11/10/22 78 4 kg (172 lb 12 8 oz)   11/01/22 79 4 kg (175 lb)   10/13/22 80 6 kg (177 lb 12 8 oz)   10/11/22 80 5 kg (177 lb 7 5 oz)   10/06/22 81 1 kg (178 lb 12 8 oz)   09/29/22 81 6 kg (180 lb)   09/22/22 81 7 kg (180 lb 3 2 oz)     Previously completed [facility] and was enrolled in [facility], but had difficulty getting to the meetings  Feeling well overall  Got off track a bit over the holidays, but has gotten back on track       Has not been food logging as much  Appetite under good control  No cravings  B: yogurt and banana or protein bar and banana  S: none  L: salad and egg or PB on [employer] toast 1-2 pieces or cottage cheese and Triscuit and cucumber and cheese    S: Yogurt or protein bar or apple  D: salad with grilled chicken or hamburger plain no bun and broccoli and brown rice  S: none     Hydration: 64-90 oz water, 15 oz coffee with FF milk and one sweet and low, 12 oz diet coke daily - working on eliminating it, 1 cup of crystal light daily  Alcohol: no alcohol past 145 days  Exercise: walks daily - walks 2500 steps and gym 2 days cardio and weights and yoga class once a week     Occupation:    Sleep: 8 hours     Colonoscopy: UTD, had Oct 2022 due 5-10 years  Mammogram: UTD, due Aug 2023       The following portions of the patient's history were reviewed and updated as appropriate: allergies, current medications, past family history, past medical history, past social history, past surgical history, and problem list     Family History   Problem Relation Age of Onset   • Diabetes Mother    • Hypertension Mother    • Heart disease Father    • Heart attack Father    • Thyroid cancer Father    • No Known Problems Sister    • No Known Problems Brother    • Stroke Neg Hx    • Hypothyroidism Neg Hx         Review of Systems   HENT: Negative for sore throat  Respiratory: Negative for cough and shortness of breath  Cardiovascular: Negative for chest pain and palpitations     Gastrointestinal: Negative for abdominal pain, constipation, diarrhea, nausea and vomiting  Denies GERD   Musculoskeletal: Positive for arthralgias (hit pain, recently had injection)  Negative for back pain  Skin: Negative for rash  Psychiatric/Behavioral: Negative for suicidal ideas (or HI)  Denies depression and anxiety       Objective:  /72   Pulse 76   Resp 16   Ht 5' 5" (1 651 m)   Wt 76 3 kg (168 lb 3 2 oz)   BMI 27 99 kg/m²     Physical Exam  Vitals and nursing note reviewed  Constitutional   General appearance: Abnormal   well developed and overweight  Eyes No conjunctival injection  Ears, Nose, Mouth, and Throat Oral mucosa moist    Pulmonary   Respiratory effort: No increased work of breathing or signs of respiratory distress  Cardiovascular   Examination of extremities for edema and/or varicosities: Normal   no edema  Abdomen   Abdomen: Abnormal overweight       Musculoskeletal   Normal range of motion  Neurological   Gait and station: Normal    Psychiatric   Orientation to person, place and time: Normal     Affect: appropriate 45.7

## 2023-10-05 ENCOUNTER — OFFICE VISIT (OUTPATIENT)
Dept: BARIATRICS | Facility: CLINIC | Age: 53
End: 2023-10-05
Payer: COMMERCIAL

## 2023-10-05 VITALS
BODY MASS INDEX: 30.49 KG/M2 | HEART RATE: 76 BPM | WEIGHT: 183 LBS | SYSTOLIC BLOOD PRESSURE: 154 MMHG | RESPIRATION RATE: 16 BRPM | HEIGHT: 65 IN | DIASTOLIC BLOOD PRESSURE: 96 MMHG

## 2023-10-05 DIAGNOSIS — R73.03 PREDIABETES: ICD-10-CM

## 2023-10-05 DIAGNOSIS — E66.9 OBESITY, CLASS I, BMI 30-34.9: Primary | ICD-10-CM

## 2023-10-05 DIAGNOSIS — F32.A DEPRESSION: ICD-10-CM

## 2023-10-05 PROCEDURE — 99214 OFFICE O/P EST MOD 30 MIN: CPT | Performed by: NURSE PRACTITIONER

## 2023-10-05 RX ORDER — DOXYCYCLINE HYCLATE 20 MG
20 TABLET ORAL DAILY
COMMUNITY
Start: 2023-09-26

## 2023-10-05 RX ORDER — CITALOPRAM 40 MG/1
40 TABLET ORAL DAILY
COMMUNITY
Start: 2023-09-19

## 2023-10-05 RX ORDER — TOPIRAMATE 25 MG/1
25 TABLET ORAL 2 TIMES DAILY
Qty: 60 TABLET | Refills: 4 | Status: SHIPPED | OUTPATIENT
Start: 2023-10-05

## 2023-10-05 NOTE — PATIENT INSTRUCTIONS
Potential side effects of Topamax (topiramate) include: numbness or tingling, fatigue, depression/anxiety, changes in taste, abdominal upset/heartburn, trouble sleeping, and may reduce sweating - stay well hydrated to avoid overheating.

## 2023-10-05 NOTE — PROGRESS NOTES
Assessment/Plan:     Obesity, Class I, BMI 30-34.9  - Patient is pursuing Conservative Program. Previously completed [facility] and was also in [facility]. - Initial weight loss goal of 5-10% weight loss for improved health  -Struggling with appetite and cravings and is interested in starting a weight loss medication to help with that. I reviewed the importance of lifestyle changes with weight loss medications. Discussed visits with the dietitian and she will think about that. - FDA approved weight loss medications reviewed: Wegovy, Saxenda, Qsymia, Contrave, and Phentermine. IBQQCH and Saxenda shortage discussed. - Not a candidate for phentermine as BP somewhat elevated - saw PCP yesterday and she will be monitoring at home.   -Denies history of kidney stones, seizures, or glaucoma. - She made an informed decision to start Topamax off label, as this also may be helpful with reducing soda intake.  -Start Topamax 25 mg twice a day, start weight 183 pounds. - Potential side effects of Topamax (topiramate) include: numbness or tingling, fatigue, depression/anxiety, changes in taste, abdominal upset/heartburn, trouble sleeping, and may reduce sweating - stay well hydrated to avoid overheating.    - S/P tubal ligation 12 years ago. Discussed teratogenicity associated with Topamax.  - Medication contract signed October 5, 2023. - Blood work reviewed: Lipid panel, A1c, CMP, CBC, and TSH October 2, 2023. A1c elevated in prediabetes range at 5.9 and will likely improve with weight loss. Remainder of the blood work was within acceptable range. Initial: 193 lbs BMI 32.1  Last visit: 168.1 lbs BMI 27.99  Current: 183 pounds BMI 30.45  Change: -10 lbs (+15 lbs since last OV)  Goal: 155-160 lbs    Goals:  Get back to food logging.    1400 calories per day  Get protein with each meal  1 to 2 cups of nonstarchy vegetables with each meal.  Try to limit starch to 1/2 cup per meal.  Continue to abstain from alcohol. Increase water intake to at least 64 ounces daily. Reduce and ideally try to eliminate soda and artificial beverages. Start Topamax    Prediabetes  - Will likely improve with weight loss. Depression  - Taking celexa. Continue management with prescribing provider. Angelica Carrasco was seen today for follow-up. Diagnoses and all orders for this visit:    Obesity, Class I, BMI 30-34.9  -     topiramate (Topamax) 25 mg tablet; Take 1 tablet (25 mg total) by mouth 2 (two) times a day    Prediabetes    Depression           Follow up in approximately 2-month nurse visit and 4 months with Non-Surgical Physician/Advanced Practitioner. Subjective:   Chief Complaint   Patient presents with   • Follow-up     MWM- OD 3mth f/u, waist 40in       Patient ID: Cresencio Londono  is a 47 y.o. female with excess weight/obesity here to pursue weight management. Patient is pursuing Conservative Program.   Most recent notes and records were reviewed. HPI    Wt Readings from Last 10 Encounters:   10/05/23 83 kg (183 lb)   01/31/23 76.3 kg (168 lb 3.2 oz)   12/01/22 76.8 kg (169 lb 6.4 oz)   11/17/22 77.5 kg (170 lb 12.8 oz)   11/10/22 78.4 kg (172 lb 12.8 oz)   11/01/22 79.4 kg (175 lb)   10/13/22 80.6 kg (177 lb 12.8 oz)   10/11/22 80.5 kg (177 lb 7.5 oz)   10/06/22 81.1 kg (178 lb 12.8 oz)   09/29/22 81.6 kg (180 lb)     Previously completed [employer] and was enrolled in [facility], but had difficulty getting to the meetings. Was last seen in medical weight management follow-up in January 2023. Was lost to follow-up after that, but presents today to reestablish care. Has gotten off track and is not eating well. Eating more sweets. Feels hungry some days. Interested in starting weight loss medication to help with appetite and cravings.       B: yogurt and protein shake   S: none  L: string cheese and cucumber and hardboiled egg and triscuits   S: apple with PB   D: mac and cheese   S: none     Hydration: 40-50 oz water, 15 oz coffee with FF milk and one sweet and low, 12 oz diet coke daily, 1 cup of crystal light daily  Alcohol: very rare  Exercise: walks 5 days per week for 30-60 minutes      Occupation:    Sleep: 8 hours     Colonoscopy: UTD, had Oct 2022 due 5-10 years  Mammogram: UTD, due August 2024       The following portions of the patient's history were reviewed and updated as appropriate: allergies, current medications, past family history, past medical history, past social history, past surgical history, and problem list.    Family History   Problem Relation Age of Onset   • Diabetes Mother    • Hypertension Mother    • Heart disease Father    • Heart attack Father    • Thyroid cancer Father    • No Known Problems Sister    • No Known Problems Brother    • Stroke Neg Hx    • Hypothyroidism Neg Hx         Review of Systems   HENT: Negative for sore throat. Respiratory: Negative for cough and shortness of breath. Cardiovascular: Negative for chest pain and palpitations. Gastrointestinal: Negative for abdominal pain, constipation, diarrhea, nausea and vomiting. Denies GERD   Musculoskeletal: Negative for arthralgias and back pain. Skin: Negative for rash. Psychiatric/Behavioral: Negative for suicidal ideas (or HI). + depression and anxiety controlled with medication       Objective:  /96   Pulse 76   Resp 16   Ht 5' 5" (1.651 m)   Wt 83 kg (183 lb)   BMI 30.45 kg/m²     Physical Exam  Vitals and nursing note reviewed. Constitutional   General appearance: Abnormal.  well developed and obese. Eyes No conjunctival injection. Ears, Nose, Mouth, and Throat Oral mucosa moist.   Pulmonary   Respiratory effort: No increased work of breathing or signs of respiratory distress. Cardiovascular     Examination of extremities for edema and/or varicosities: Normal.  no edema. Abdomen   Abdomen: Abnormal.  The abdomen was obese. Musculoskeletal   Normal range of motion  Neurological   Gait and station: Normal.    Psychiatric   Orientation to person, place and time: Normal.    Affect: appropriate

## 2023-10-05 NOTE — ASSESSMENT & PLAN NOTE
- Patient is pursuing Conservative Program. Previously completed [facility] and was also in [facility]. - Initial weight loss goal of 5-10% weight loss for improved health  -Struggling with appetite and cravings and is interested in starting a weight loss medication to help with that. I reviewed the importance of lifestyle changes with weight loss medications. Discussed visits with the dietitian and she will think about that. - FDA approved weight loss medications reviewed: Wegovy, Saxenda, Qsymia, Contrave, and Phentermine. JJEKEK and Saxenda shortage discussed. - Not a candidate for phentermine as BP somewhat elevated - saw PCP yesterday and she will be monitoring at home.   -Denies history of kidney stones, seizures, or glaucoma. - She made an informed decision to start Topamax off label, as this also may be helpful with reducing soda intake.  -Start Topamax 25 mg twice a day, start weight 183 pounds. - Potential side effects of Topamax (topiramate) include: numbness or tingling, fatigue, depression/anxiety, changes in taste, abdominal upset/heartburn, trouble sleeping, and may reduce sweating - stay well hydrated to avoid overheating.    - S/P tubal ligation 12 years ago. Discussed teratogenicity associated with Topamax.  - Medication contract signed October 5, 2023. - Blood work reviewed: Lipid panel, A1c, CMP, CBC, and TSH October 2, 2023. A1c elevated in prediabetes range at 5.9 and will likely improve with weight loss. Remainder of the blood work was within acceptable range. Initial: 193 lbs BMI 32.1  Last visit: 168.1 lbs BMI 27.99  Current: 183 pounds BMI 30.45  Change: -10 lbs (+15 lbs since last OV)  Goal: 155-160 lbs    Goals:  Get back to food logging. 1400 calories per day  Get protein with each meal  1 to 2 cups of nonstarchy vegetables with each meal.  Try to limit starch to 1/2 cup per meal.  Continue to abstain from alcohol.    Increase water intake to at least 64 ounces daily. Reduce and ideally try to eliminate soda and artificial beverages.   Start Topamax

## 2023-12-05 ENCOUNTER — CLINICAL SUPPORT (OUTPATIENT)
Dept: BARIATRICS | Facility: CLINIC | Age: 53
End: 2023-12-05

## 2023-12-05 ENCOUNTER — TELEPHONE (OUTPATIENT)
Dept: BARIATRICS | Facility: CLINIC | Age: 53
End: 2023-12-05

## 2023-12-05 VITALS
RESPIRATION RATE: 16 BRPM | BODY MASS INDEX: 29.26 KG/M2 | HEART RATE: 66 BPM | WEIGHT: 175.6 LBS | SYSTOLIC BLOOD PRESSURE: 130 MMHG | HEIGHT: 65 IN | DIASTOLIC BLOOD PRESSURE: 80 MMHG

## 2023-12-05 DIAGNOSIS — R63.5 ABNORMAL WEIGHT GAIN: Primary | ICD-10-CM

## 2023-12-05 DIAGNOSIS — E66.3 OVERWEIGHT: Primary | ICD-10-CM

## 2023-12-05 DIAGNOSIS — R73.03 PREDIABETES: ICD-10-CM

## 2023-12-05 PROCEDURE — RECHECK

## 2023-12-05 RX ORDER — TOPIRAMATE 50 MG/1
50 TABLET, FILM COATED ORAL 2 TIMES DAILY
Qty: 60 TABLET | Refills: 2 | Status: SHIPPED | OUTPATIENT
Start: 2023-12-05

## 2023-12-05 NOTE — TELEPHONE ENCOUNTER
Patient came in for a nurse visit today. Patient is requesting an increase of her topiramate to be sent to [address] [place] [address] on file already. Patient stated that she is feeling well with the medication.

## 2023-12-05 NOTE — PROGRESS NOTES
Patient last visit weight:  Patient current visit weight:    If you are taking phentermine or other oral weight loss medications, are you experiencing any of the following symptoms:  Headache: NO  Blurred Vision: NO  Chest Pain: NO  Palpitations:NO  Insomnia: NO  SPECIFY ORAL MEDICATION AND DOSAGE: TOPIRMATE    If you are taking an injectable medication,  are you experiencing any of the following symptoms:  Bloating:   Nausea:  Vomiting:   Constipation:   Diarrhea:  SPECIFY INJECTABLE MEDICATION AND CURRENT DOSAGE:      Vitals:    Is BP less than 100/60? NO  Is BP greater than 140/90? NO  Is HR greater than 100? NO  **If yes to any of the above, have patient relax and repeat in 5-10 minutes**    Repeat values:    Is BP less than 100/60? Is BP greater than 140/90? Is HR greater than 100?   **If values remain outside of ranges above, please consult provider for next steps**

## 2024-04-03 DIAGNOSIS — Z00.6 ENCOUNTER FOR EXAMINATION FOR NORMAL COMPARISON OR CONTROL IN CLINICAL RESEARCH PROGRAM: ICD-10-CM

## 2024-04-10 ENCOUNTER — TELEPHONE (OUTPATIENT)
Dept: BARIATRICS | Facility: CLINIC | Age: 54
End: 2024-04-10

## 2024-06-06 ENCOUNTER — OFFICE VISIT (OUTPATIENT)
Dept: BARIATRICS | Facility: CLINIC | Age: 54
End: 2024-06-06
Payer: COMMERCIAL

## 2024-06-06 VITALS
WEIGHT: 188.4 LBS | HEART RATE: 84 BPM | SYSTOLIC BLOOD PRESSURE: 141 MMHG | BODY MASS INDEX: 31.39 KG/M2 | DIASTOLIC BLOOD PRESSURE: 94 MMHG | RESPIRATION RATE: 17 BRPM | HEIGHT: 65 IN | TEMPERATURE: 97.1 F

## 2024-06-06 DIAGNOSIS — I10 ESSENTIAL HYPERTENSION: ICD-10-CM

## 2024-06-06 DIAGNOSIS — E66.9 OBESITY, CLASS I, BMI 30-34.9: Primary | ICD-10-CM

## 2024-06-06 DIAGNOSIS — R73.03 PREDIABETES: ICD-10-CM

## 2024-06-06 PROCEDURE — 99214 OFFICE O/P EST MOD 30 MIN: CPT | Performed by: PHYSICIAN ASSISTANT

## 2024-06-06 RX ORDER — METFORMIN HYDROCHLORIDE 750 MG/1
750 TABLET, EXTENDED RELEASE ORAL
Qty: 30 TABLET | Refills: 2 | Status: SHIPPED | OUTPATIENT
Start: 2024-06-06

## 2024-06-06 RX ORDER — TOPIRAMATE 25 MG/1
25 TABLET ORAL 2 TIMES DAILY
Qty: 60 TABLET | Refills: 1 | Status: SHIPPED | OUTPATIENT
Start: 2024-06-06

## 2024-06-06 RX ORDER — LISINOPRIL 20 MG/1
20 TABLET ORAL DAILY
COMMUNITY

## 2024-06-06 NOTE — ASSESSMENT & PLAN NOTE
- Patient is pursuing Conservative Program. Previously completed [facility] and was also in [facility].  - Initial weight loss goal of 5-10% weight loss for improved health    To restart topamax. She does believe it was helping but stopped it. To start on metformin also  -Other options:to avoid GLP1 RA due to FH thyroid cancer and not likely covereed. - Not a candidate for phentermine as BP elevated. Also to avoid contrave- She made an informed decision to start Topamax off label, as this also may be helpful with reducing soda intake.  - S/P tubal ligation 12 years ago.   - Medication contract signed October 5, 2023.     - Blood work reviewed: Lipid panel, A1c, CMP, CBC, and TSH October 2, 2023.  A1c elevated in prediabetes range at 5.9 and will likely improve with weight loss.  Remainder of the blood work was within acceptable range.      Initial: 193 lbs BMI 32.1  Last visit: 183  Current: 188  Change: -5  Goal: 155-160 lbs    Goals:  Get back to food logging-1400 calories per day  Increase water intake to at least 64 ounces daily.  Reduce and ideally try to eliminate soda and artificial beverages.  To continue with increasing exercise

## 2024-06-06 NOTE — ASSESSMENT & PLAN NOTE
On lisionpril  -should improve with weight loss, dietary, and lifestyle changes  -seeing PCP for recheck next week

## 2024-06-06 NOTE — PROGRESS NOTES
Assessment/Plan:    Prediabetes  To start on metformin. FH of thyroid cancer so to avoid GLP1 RA but unsure of coverage     Obesity, Class I, BMI 30-34.9  - Patient is pursuing Conservative Program. Previously completed [facility] and was also in [facility].  - Initial weight loss goal of 5-10% weight loss for improved health    To restart topamax. She does believe it was helping but stopped it. To start on metformin also  -Other options:to avoid GLP1 RA due to FH thyroid cancer and not likely covereed. - Not a candidate for phentermine as BP elevated. Also to avoid contrave- She made an informed decision to start Topamax off label, as this also may be helpful with reducing soda intake.  - S/P tubal ligation 12 years ago.   - Medication contract signed October 5, 2023.     - Blood work reviewed: Lipid panel, A1c, CMP, CBC, and TSH October 2, 2023.  A1c elevated in prediabetes range at 5.9 and will likely improve with weight loss.  Remainder of the blood work was within acceptable range.      Initial: 193 lbs BMI 32.1  Last visit: 183  Current: 188  Change: -5  Goal: 155-160 lbs    Goals:  Get back to food logging-1400 calories per day  Increase water intake to at least 64 ounces daily.  Reduce and ideally try to eliminate soda and artificial beverages.  To continue with increasing exercise    Essential hypertension  On lisionpril  -should improve with weight loss, dietary, and lifestyle changes  -seeing PCP for recheck next week        Follow up in approximately  1 month nurse visit, 3 months nurse visit and 5 months  with Non-Surgical Physician/Advanced Practitioner.     Diagnoses and all orders for this visit:    Obesity, Class I, BMI 30-34.9  -     metFORMIN (GLUCOPHAGE-XR) 750 mg 24 hr tablet; Take 1 tablet (750 mg total) by mouth daily with breakfast  -     topiramate (Topamax) 25 mg tablet; Take 1 tablet (25 mg total) by mouth 2 (two) times a day    Prediabetes  -     metFORMIN (GLUCOPHAGE-XR) 750 mg 24 hr  tablet; Take 1 tablet (750 mg total) by mouth daily with breakfast    Essential hypertension    Other orders  -     lisinopril (ZESTRIL) 20 mg tablet; Take 20 mg by mouth daily          Subjective:   Chief Complaint   Patient presents with    Follow-up     MWM-5M F/u;waist-41in        Patient ID: Kay Herring  is a 53 y.o. female with excess weight/obesity here to pursue weight managment.  Patient is pursuing [facility] Program after [facility]    HPI  She was on topamax but stopped it. It was helping but she stopped it. She had fallen off track but is now trying to make more changes  Wt Readings from Last 10 Encounters:   24 85.5 kg (188 lb 6.4 oz)   23 79.7 kg (175 lb 9.6 oz)   10/05/23 83 kg (183 lb)   23 76.3 kg (168 lb 3.2 oz)   22 76.8 kg (169 lb 6.4 oz)   22 77.5 kg (170 lb 12.8 oz)   11/10/22 78.4 kg (172 lb 12.8 oz)   22 79.4 kg (175 lb)   10/13/22 80.6 kg (177 lb 12.8 oz)   10/11/22 80.5 kg (177 lb 7.5 oz)       B: yogurt (oikos triple zero)and fruit  S: none  L:vegetable, protein (egg or cottage cheese), triscuits  S: apple with PB   D: varies  S: none     Food logging: restarting.  1400 calories and 75 gm protein  Hydration: 40-50 oz water, 15 oz coffee with FF milk and one sweet and low, 12 oz diet coke daily  Alcohol: very rare  Exercise: walking 4 x week (trying to increase)and aqua aerobics-2 x week goal   Occupation:    Sleep: 8 hours      The following portions of the patient's history were reviewed and updated as appropriate: She  has a past medical history of Anxiety and Depression.  She   Patient Active Problem List    Diagnosis Date Noted    Essential hypertension 2024    Obesity, Class I, BMI 30-34.9 10/05/2023    Prediabetes 10/25/2022    Overweight 2022    Anxiety 2022    Depression 2022     She  has a past surgical history that includes  section; [place] tooth extraction; and Tubal ligation  (2012).  Her family history includes Diabetes in her mother; Heart attack in her father; Heart disease in her father; Hypertension in her mother; No Known Problems in her brother and sister; Thyroid cancer in her father.  She  reports that she has never smoked. She has never used smokeless tobacco. She reports current alcohol use. She reports that she does not use drugs.  Current Outpatient Medications   Medication Sig Dispense Refill    Calcium Carbonate-Vitamin D (CALCIUM-VITAMIN D3 PO) Take by mouth      cetirizine (ZyrTEC) 10 mg tablet Take 10 mg by mouth daily      citalopram (CeleXA) 40 mg tablet Take 40 mg by mouth daily      doxycycline (PERIOSTAT) 20 MG tablet Take 20 mg by mouth daily      lisinopril (ZESTRIL) 20 mg tablet Take 20 mg by mouth daily      metFORMIN (GLUCOPHAGE-XR) 750 mg 24 hr tablet Take 1 tablet (750 mg total) by mouth daily with breakfast 30 tablet 2    multivitamin (THERAGRAN) TABS Take 1 tablet by mouth daily      topiramate (Topamax) 25 mg tablet Take 1 tablet (25 mg total) by mouth 2 (two) times a day 60 tablet 1    topiramate (Topamax) 50 MG tablet Take 1 tablet (50 mg total) by mouth 2 (two) times a day 60 tablet 2     No current facility-administered medications for this visit.     Current Outpatient Medications on File Prior to Visit   Medication Sig    Calcium Carbonate-Vitamin D (CALCIUM-VITAMIN D3 PO) Take by mouth    cetirizine (ZyrTEC) 10 mg tablet Take 10 mg by mouth daily    citalopram (CeleXA) 40 mg tablet Take 40 mg by mouth daily    doxycycline (PERIOSTAT) 20 MG tablet Take 20 mg by mouth daily    lisinopril (ZESTRIL) 20 mg tablet Take 20 mg by mouth daily    multivitamin (THERAGRAN) TABS Take 1 tablet by mouth daily    topiramate (Topamax) 50 MG tablet Take 1 tablet (50 mg total) by mouth 2 (two) times a day     No current facility-administered medications on file prior to visit.     She has No Known Allergies..    Review of Systems   Constitutional:  Negative for  "fever.   Respiratory:  Negative for shortness of breath.    Cardiovascular:  Negative for chest pain and palpitations.   Gastrointestinal:  Negative for abdominal pain, constipation, diarrhea and vomiting.   Genitourinary:  Negative for difficulty urinating.   Skin:  Negative for rash.   Neurological:  Negative for headaches.   Psychiatric/Behavioral:  Negative for dysphoric mood. The patient is not nervous/anxious.        Objective:    /94   Pulse 84   Temp (!) 97.1 °F (36.2 °C)   Resp 17   Ht 5' 4.5\" (1.638 m)   Wt 85.5 kg (188 lb 6.4 oz)   BMI 31.84 kg/m²      Physical Exam  Vitals and nursing note reviewed.   Constitutional:       General: She is not in acute distress.     Appearance: She is well-developed. She is obese.   HENT:      Head: Normocephalic and atraumatic.   Eyes:      Conjunctiva/sclera: Conjunctivae normal.   Neck:      Thyroid: No thyromegaly.   Pulmonary:      Effort: Pulmonary effort is normal. No respiratory distress.   Skin:     Findings: No rash (visible).   Neurological:      Mental Status: She is alert and oriented to person, place, and time.   Psychiatric:         Behavior: Behavior normal.         "

## 2024-06-29 DIAGNOSIS — E66.9 OBESITY, CLASS I, BMI 30-34.9: ICD-10-CM

## 2024-06-29 DIAGNOSIS — R73.03 PREDIABETES: ICD-10-CM

## 2024-07-01 RX ORDER — METFORMIN HYDROCHLORIDE 750 MG/1
750 TABLET, EXTENDED RELEASE ORAL
Qty: 90 TABLET | Refills: 1 | Status: SHIPPED | OUTPATIENT
Start: 2024-07-01 | End: 2024-07-05 | Stop reason: SDUPTHER

## 2024-07-01 RX ORDER — TOPIRAMATE 25 MG/1
25 TABLET ORAL 2 TIMES DAILY
Qty: 180 TABLET | Refills: 1 | Status: SHIPPED | OUTPATIENT
Start: 2024-07-01

## 2024-07-03 ENCOUNTER — TELEPHONE (OUTPATIENT)
Age: 54
End: 2024-07-03

## 2024-07-05 ENCOUNTER — CLINICAL SUPPORT (OUTPATIENT)
Dept: BARIATRICS | Facility: CLINIC | Age: 54
End: 2024-07-05

## 2024-07-05 VITALS
SYSTOLIC BLOOD PRESSURE: 122 MMHG | TEMPERATURE: 96.9 F | WEIGHT: 187.8 LBS | DIASTOLIC BLOOD PRESSURE: 80 MMHG | BODY MASS INDEX: 31.29 KG/M2 | HEART RATE: 74 BPM | HEIGHT: 65 IN | RESPIRATION RATE: 16 BRPM

## 2024-07-05 DIAGNOSIS — R63.5 ABNORMAL WEIGHT GAIN: Primary | ICD-10-CM

## 2024-07-05 DIAGNOSIS — R73.03 PREDIABETES: ICD-10-CM

## 2024-07-05 DIAGNOSIS — E66.9 OBESITY, CLASS I, BMI 30-34.9: ICD-10-CM

## 2024-07-05 PROCEDURE — RECHECK

## 2024-07-05 RX ORDER — METFORMIN HYDROCHLORIDE 750 MG/1
750 TABLET, EXTENDED RELEASE ORAL
Qty: 90 TABLET | Refills: 1 | Status: SHIPPED | OUTPATIENT
Start: 2024-07-05

## 2024-07-05 NOTE — PROGRESS NOTES
Patient last visit weight: 188lb  Patient current visit weight: 187.8lb    If you are taking phentermine or other oral weight loss medications, are you experiencing any of the following symptoms:  Headache: NO  Blurred Vision: NO  Chest Pain: NO  Palpitations: NO  Insomnia: NO  SPECIFY ORAL MEDICATION AND DOSAGE: Topiramate 50mg; Metformin 750mg  Patient is requesting a refill of Metformin.      If you are taking an injectable medication,  are you experiencing any of the following symptoms:  Bloating:   Nausea:  Vomiting:   Constipation:   Diarrhea:  SPECIFY INJECTABLE MEDICATION AND CURRENT DOSAGE:      Vitals:    Is BP less than 100/60? NO  Is BP greater than 140/90? NO  Is HR greater than 100? NO  **If yes to any of the above, have patient relax and repeat in 5-10 minutes**    Repeat values:    Is BP less than 100/60?  Is BP greater than 140/90?  Is HR greater than 100?  **If values remain outside of ranges above, please consult provider for next steps**

## 2024-09-06 ENCOUNTER — CLINICAL SUPPORT (OUTPATIENT)
Dept: BARIATRICS | Facility: CLINIC | Age: 54
End: 2024-09-06

## 2024-09-06 VITALS
SYSTOLIC BLOOD PRESSURE: 126 MMHG | HEIGHT: 65 IN | TEMPERATURE: 97.7 F | WEIGHT: 186 LBS | RESPIRATION RATE: 17 BRPM | DIASTOLIC BLOOD PRESSURE: 84 MMHG | HEART RATE: 86 BPM | BODY MASS INDEX: 30.99 KG/M2

## 2024-09-06 DIAGNOSIS — R63.5 ABNORMAL WEIGHT GAIN: Primary | ICD-10-CM

## 2024-09-06 PROCEDURE — RECHECK

## 2024-09-06 NOTE — PROGRESS NOTES
Patient last visit weight: 187.5lbs   Patient current visit weight: 186lbs     If you are taking phentermine or other oral weight loss medications, are you experiencing any of the following symptoms:  Headache: NO   Blurred Vision: NO   Chest Pain: NO   Palpitations: NO  Insomnia: NO   SPECIFY ORAL MEDICATION AND DOSAGE: Metformin 750mg & Topamax 25mg. Patient states she feels the medication is not working for her at all. States she is willing to just meet with dieticians from here on out but is wondering if there are any other pill options for her. States she would try injectables but her insurance does not cover.     If you are taking an injectable medication,  are you experiencing any of the following symptoms:  Bloating:   Nausea:  Vomiting:   Constipation:   Diarrhea:  SPECIFY INJECTABLE MEDICATION AND CURRENT DOSAGE:      Vitals:    Is BP less than 100/60? NO   Is BP greater than 140/90? NO   Is HR greater than 100? NO   **If yes to any of the above, have patient relax and repeat in 5-10 minutes**    Repeat values:    Is BP less than 100/60?  Is BP greater than 140/90?  Is HR greater than 100?  **If values remain outside of ranges above, please consult provider for next steps**